# Patient Record
Sex: FEMALE | Race: WHITE | NOT HISPANIC OR LATINO | Employment: FULL TIME | URBAN - METROPOLITAN AREA
[De-identification: names, ages, dates, MRNs, and addresses within clinical notes are randomized per-mention and may not be internally consistent; named-entity substitution may affect disease eponyms.]

---

## 2023-05-18 ENCOUNTER — HOSPITAL ENCOUNTER (OUTPATIENT)
Dept: DATA CONVERSION | Facility: HOSPITAL | Age: 48
End: 2023-05-18
Attending: STUDENT IN AN ORGANIZED HEALTH CARE EDUCATION/TRAINING PROGRAM | Admitting: STUDENT IN AN ORGANIZED HEALTH CARE EDUCATION/TRAINING PROGRAM
Payer: COMMERCIAL

## 2023-05-18 DIAGNOSIS — K64.9 UNSPECIFIED HEMORRHOIDS: ICD-10-CM

## 2023-05-18 DIAGNOSIS — H35.30 UNSPECIFIED MACULAR DEGENERATION: ICD-10-CM

## 2023-05-18 DIAGNOSIS — K63.5 POLYP OF COLON: ICD-10-CM

## 2023-05-18 DIAGNOSIS — D12.5 BENIGN NEOPLASM OF SIGMOID COLON: ICD-10-CM

## 2023-05-18 DIAGNOSIS — D12.2 BENIGN NEOPLASM OF ASCENDING COLON: ICD-10-CM

## 2023-05-18 DIAGNOSIS — E07.9 DISORDER OF THYROID, UNSPECIFIED: ICD-10-CM

## 2023-05-18 DIAGNOSIS — Z12.11 ENCOUNTER FOR SCREENING FOR MALIGNANT NEOPLASM OF COLON: ICD-10-CM

## 2023-05-18 LAB — HCG, URINE: NEGATIVE

## 2023-05-25 LAB
COMPLETE PATHOLOGY REPORT: NORMAL
CONVERTED CLINICAL DIAGNOSIS-HISTORY: NORMAL
CONVERTED FINAL DIAGNOSIS: NORMAL
CONVERTED FINAL REPORT PDF LINK TO COPY AND PASTE: NORMAL
CONVERTED GROSS DESCRIPTION: NORMAL

## 2023-07-29 DIAGNOSIS — E03.9 HYPOTHYROIDISM, UNSPECIFIED: ICD-10-CM

## 2023-07-31 RX ORDER — LEVOTHYROXINE SODIUM 75 UG/1
75 TABLET ORAL DAILY
Qty: 30 TABLET | Refills: 0 | Status: SHIPPED | OUTPATIENT
Start: 2023-07-31 | End: 2023-08-28

## 2023-08-18 LAB
ALANINE AMINOTRANSFERASE (SGPT) (U/L) IN SER/PLAS: 13 U/L (ref 7–45)
ASPARTATE AMINOTRANSFERASE (SGOT) (U/L) IN SER/PLAS: 17 U/L (ref 9–39)

## 2023-08-26 DIAGNOSIS — E03.9 HYPOTHYROIDISM, UNSPECIFIED: ICD-10-CM

## 2023-08-28 RX ORDER — LEVOTHYROXINE SODIUM 75 UG/1
75 TABLET ORAL DAILY
Qty: 30 TABLET | Refills: 0 | Status: SHIPPED | OUTPATIENT
Start: 2023-08-28 | End: 2023-09-21 | Stop reason: SDUPTHER

## 2023-09-07 VITALS — HEIGHT: 66 IN | WEIGHT: 260.36 LBS | BODY MASS INDEX: 41.84 KG/M2

## 2023-09-21 ENCOUNTER — TELEPHONE (OUTPATIENT)
Dept: PRIMARY CARE | Facility: CLINIC | Age: 48
End: 2023-09-21

## 2023-09-21 ENCOUNTER — OFFICE VISIT (OUTPATIENT)
Dept: PRIMARY CARE | Facility: CLINIC | Age: 48
End: 2023-09-21
Payer: COMMERCIAL

## 2023-09-21 VITALS
WEIGHT: 266 LBS | TEMPERATURE: 98.7 F | BODY MASS INDEX: 44.32 KG/M2 | DIASTOLIC BLOOD PRESSURE: 82 MMHG | SYSTOLIC BLOOD PRESSURE: 128 MMHG | HEIGHT: 65 IN

## 2023-09-21 DIAGNOSIS — E03.9 HYPOTHYROIDISM, UNSPECIFIED: ICD-10-CM

## 2023-09-21 DIAGNOSIS — Z00.00 GENERAL MEDICAL EXAM: Primary | ICD-10-CM

## 2023-09-21 LAB
ALANINE AMINOTRANSFERASE (SGPT) (U/L) IN SER/PLAS: 14 U/L (ref 7–45)
ALBUMIN (G/DL) IN SER/PLAS: 4.4 G/DL (ref 3.4–5)
ALKALINE PHOSPHATASE (U/L) IN SER/PLAS: 71 U/L (ref 33–110)
ANION GAP IN SER/PLAS: 13 MMOL/L (ref 10–20)
ASPARTATE AMINOTRANSFERASE (SGOT) (U/L) IN SER/PLAS: 16 U/L (ref 9–39)
BASOPHILS (10*3/UL) IN BLOOD BY AUTOMATED COUNT: 0.05 X10E9/L (ref 0–0.1)
BASOPHILS/100 LEUKOCYTES IN BLOOD BY AUTOMATED COUNT: 0.8 % (ref 0–2)
BILIRUBIN TOTAL (MG/DL) IN SER/PLAS: 0.5 MG/DL (ref 0–1.2)
CALCIUM (MG/DL) IN SER/PLAS: 9.5 MG/DL (ref 8.6–10.3)
CARBON DIOXIDE, TOTAL (MMOL/L) IN SER/PLAS: 26 MMOL/L (ref 21–32)
CHLORIDE (MMOL/L) IN SER/PLAS: 106 MMOL/L (ref 98–107)
CREATININE (MG/DL) IN SER/PLAS: 0.74 MG/DL (ref 0.5–1.05)
EOSINOPHILS (10*3/UL) IN BLOOD BY AUTOMATED COUNT: 0.07 X10E9/L (ref 0–0.7)
EOSINOPHILS/100 LEUKOCYTES IN BLOOD BY AUTOMATED COUNT: 1.2 % (ref 0–6)
ERYTHROCYTE DISTRIBUTION WIDTH (RATIO) BY AUTOMATED COUNT: 13.1 % (ref 11.5–14.5)
ERYTHROCYTE MEAN CORPUSCULAR HEMOGLOBIN CONCENTRATION (G/DL) BY AUTOMATED: 32.6 G/DL (ref 32–36)
ERYTHROCYTE MEAN CORPUSCULAR VOLUME (FL) BY AUTOMATED COUNT: 88 FL (ref 80–100)
ERYTHROCYTES (10*6/UL) IN BLOOD BY AUTOMATED COUNT: 5.01 X10E12/L (ref 4–5.2)
GFR FEMALE: >90 ML/MIN/1.73M2
GLUCOSE (MG/DL) IN SER/PLAS: 94 MG/DL (ref 74–99)
HEMATOCRIT (%) IN BLOOD BY AUTOMATED COUNT: 43.9 % (ref 36–46)
HEMOGLOBIN (G/DL) IN BLOOD: 14.3 G/DL (ref 12–16)
IMMATURE GRANULOCYTES/100 LEUKOCYTES IN BLOOD BY AUTOMATED COUNT: 0.5 % (ref 0–0.9)
LEUKOCYTES (10*3/UL) IN BLOOD BY AUTOMATED COUNT: 6 X10E9/L (ref 4.4–11.3)
LYMPHOCYTES (10*3/UL) IN BLOOD BY AUTOMATED COUNT: 1.47 X10E9/L (ref 1.2–4.8)
LYMPHOCYTES/100 LEUKOCYTES IN BLOOD BY AUTOMATED COUNT: 24.3 % (ref 13–44)
MONOCYTES (10*3/UL) IN BLOOD BY AUTOMATED COUNT: 0.52 X10E9/L (ref 0.1–1)
MONOCYTES/100 LEUKOCYTES IN BLOOD BY AUTOMATED COUNT: 8.6 % (ref 2–10)
NEUTROPHILS (10*3/UL) IN BLOOD BY AUTOMATED COUNT: 3.9 X10E9/L (ref 1.2–7.7)
NEUTROPHILS/100 LEUKOCYTES IN BLOOD BY AUTOMATED COUNT: 64.6 % (ref 40–80)
PLATELETS (10*3/UL) IN BLOOD AUTOMATED COUNT: 291 X10E9/L (ref 150–450)
POC BILIRUBIN, URINE: NEGATIVE
POC BLOOD, URINE: NEGATIVE
POC FINGERSTICK BLOOD GLUCOSE: 117 MG/DL (ref 70–100)
POC GLUCOSE, URINE: NEGATIVE MG/DL
POC HDL CHOLESTEROL: 40 MG/DL (ref 0–50)
POC HEMOGLOBIN A1C: 5.2 % (ref 4.2–6.5)
POC KETONES, URINE: NEGATIVE MG/DL
POC LDL CHOLESTEROL: 75 MG/DL (ref 0–100)
POC LEUKOCYTES, URINE: ABNORMAL
POC NITRITE,URINE: NEGATIVE
POC NON-HDL CHOLESTEROL: 112 MG/DL (ref 0–130)
POC PH, URINE: 5.5 PH
POC PROTEIN, URINE: NEGATIVE MG/DL
POC SPECIFIC GRAVITY, URINE: 1.02
POC TOTAL CHOLESTEROL/HDL RATIO: 3.8 (ref 0–4.5)
POC TOTAL CHOLESTEROL: 152 MG/DL (ref 0–199)
POC TRIGLYCERIDES: 184 MG/DL (ref 0–150)
POC UROBILINOGEN, URINE: 0.2 EU/DL
POTASSIUM (MMOL/L) IN SER/PLAS: 4.5 MMOL/L (ref 3.5–5.3)
PROTEIN TOTAL: 7.1 G/DL (ref 6.4–8.2)
SODIUM (MMOL/L) IN SER/PLAS: 140 MMOL/L (ref 136–145)
THYROTROPIN (MIU/L) IN SER/PLAS BY DETECTION LIMIT <= 0.05 MIU/L: 1.29 MIU/L (ref 0.44–3.98)
UREA NITROGEN (MG/DL) IN SER/PLAS: 11 MG/DL (ref 6–23)

## 2023-09-21 PROCEDURE — 83036 HEMOGLOBIN GLYCOSYLATED A1C: CPT | Performed by: FAMILY MEDICINE

## 2023-09-21 PROCEDURE — 84443 ASSAY THYROID STIM HORMONE: CPT

## 2023-09-21 PROCEDURE — 82962 GLUCOSE BLOOD TEST: CPT | Performed by: FAMILY MEDICINE

## 2023-09-21 PROCEDURE — 80053 COMPREHEN METABOLIC PANEL: CPT

## 2023-09-21 PROCEDURE — 85025 COMPLETE CBC W/AUTO DIFF WBC: CPT

## 2023-09-21 PROCEDURE — 99396 PREV VISIT EST AGE 40-64: CPT | Performed by: FAMILY MEDICINE

## 2023-09-21 PROCEDURE — 81003 URINALYSIS AUTO W/O SCOPE: CPT | Performed by: FAMILY MEDICINE

## 2023-09-21 PROCEDURE — 1036F TOBACCO NON-USER: CPT | Performed by: FAMILY MEDICINE

## 2023-09-21 PROCEDURE — 80061 LIPID PANEL: CPT | Performed by: FAMILY MEDICINE

## 2023-09-21 RX ORDER — TERBINAFINE HYDROCHLORIDE 250 MG/1
250 TABLET ORAL DAILY
COMMUNITY
Start: 2023-08-28 | End: 2024-01-24 | Stop reason: WASHOUT

## 2023-09-21 RX ORDER — LEVOTHYROXINE SODIUM 75 UG/1
75 TABLET ORAL DAILY
Qty: 90 TABLET | Refills: 1 | Status: SHIPPED | OUTPATIENT
Start: 2023-09-21 | End: 2024-03-19

## 2023-09-21 RX ORDER — TIMOLOL MALEATE 5 MG/ML
1 SOLUTION/ DROPS OPHTHALMIC DAILY
COMMUNITY

## 2023-09-21 ASSESSMENT — ENCOUNTER SYMPTOMS
NUMBNESS: 0
HEADACHES: 0
FREQUENCY: 0
CONSTIPATION: 0
SINUS PRESSURE: 0
RHINORRHEA: 0
WEAKNESS: 0
HEMATURIA: 0
COUGH: 0
WHEEZING: 0
ABDOMINAL PAIN: 0
NAUSEA: 0
PALPITATIONS: 0
FATIGUE: 0
NERVOUS/ANXIOUS: 0
MYALGIAS: 0
WOUND: 0
VOMITING: 0
DIARRHEA: 0
ARTHRALGIAS: 0
ROS SKIN COMMENTS: NO MOLES GROWING OR CHANGING.
SHORTNESS OF BREATH: 0
ADENOPATHY: 0
BLOOD IN STOOL: 0
DYSPHORIC MOOD: 0
VOICE CHANGE: 0
DEPRESSION: 0
FEVER: 0
SLEEP DISTURBANCE: 0
BACK PAIN: 0
SORE THROAT: 0

## 2023-09-21 ASSESSMENT — PATIENT HEALTH QUESTIONNAIRE - PHQ9
1. LITTLE INTEREST OR PLEASURE IN DOING THINGS: NOT AT ALL
2. FEELING DOWN, DEPRESSED OR HOPELESS: NOT AT ALL
SUM OF ALL RESPONSES TO PHQ9 QUESTIONS 1 AND 2: 0

## 2023-09-21 NOTE — PATIENT INSTRUCTIONS
I will order labs.  Weight loss is recommended.  You decline a flu shot today.  Continue with regular exercise.  Try to limit calories from the diet, especially starches and sugars.  Return in six months for a recheck on your hypothyroidism.    You will be due for a mammogram in December.  You should repeat a colonoscopy in three years.

## 2023-09-21 NOTE — PROGRESS NOTES
"Subjective   Patient ID: [id number]     Maegan Kemp is a 47 y.o. female who presents for Annual Exam (Fasting).  HPI  She is here for a general medical examination.      She feels well in general.      She had a colonoscopy in May.  It showed several polyps, adenomatous.  Two of them measured 10 mm.  Recheck recommended in three years.      Never a smoker.  Occasional small amounts of alcohol.  No drugs.    Famhx dad has a fib and DM, hypertension.  Mom has hypothyroidism.  Sister has thyroid disease and has had liver cancer.      She exercises.  Volleyball three nights a week.      Tries to maintain a healthy diet.  Especially this past month--no fast food.      Sees a dentist.  Was there Tuesday.      Sees gynecology through [facility].  Mammo due 12/23.    Saw an eye doctor.  Had white flashes and a large floater.  She is following ophtho.  Surgery not needed.  Follow up prn only.  No association with head trauma.      Review of Systems   Constitutional:  Negative for fatigue and fever.   HENT:  Negative for rhinorrhea, sinus pressure, sore throat and voice change.    Respiratory:  Negative for cough, shortness of breath and wheezing.    Cardiovascular:  Negative for chest pain, palpitations and leg swelling.   Gastrointestinal:  Negative for abdominal pain, blood in stool, constipation, diarrhea, nausea and vomiting.   Genitourinary:  Negative for frequency, hematuria and vaginal bleeding.   Musculoskeletal:  Negative for arthralgias, back pain and myalgias.   Skin:  Negative for rash and wound.        No moles growing or changing.   Neurological:  Negative for syncope, weakness, numbness and headaches.   Hematological:  Negative for adenopathy.   Psychiatric/Behavioral:  Negative for dysphoric mood, self-injury, sleep disturbance and suicidal ideas. The patient is not nervous/anxious.          Objective     /82 (BP Location: Right arm, Patient Position: Sitting)   Temp 37.1 °C (98.7 °F)   Ht 1.657 m (5' 5.25\") "   Wt 121 kg (266 lb)   BMI 43.93 kg/m²      Physical Exam  Vitals reviewed.   Constitutional:       General: She is not in acute distress.     Appearance: Normal appearance. She is not ill-appearing or toxic-appearing.   HENT:      Head: Normocephalic and atraumatic.      Right Ear: Tympanic membrane, ear canal and external ear normal.      Left Ear: Tympanic membrane, ear canal and external ear normal.      Nose: Nose normal.      Mouth/Throat:      Mouth: Mucous membranes are moist.   Eyes:      Extraocular Movements: Extraocular movements intact.      Conjunctiva/sclera: Conjunctivae normal.      Pupils: Pupils are equal, round, and reactive to light.   Cardiovascular:      Rate and Rhythm: Normal rate and regular rhythm.      Heart sounds: No murmur heard.  Pulmonary:      Effort: Pulmonary effort is normal.      Breath sounds: Normal breath sounds.   Abdominal:      General: Bowel sounds are normal. There is no distension.      Palpations: Abdomen is soft. There is no mass.      Tenderness: There is no abdominal tenderness. There is no guarding or rebound.   Musculoskeletal:         General: No tenderness.      Cervical back: Neck supple.      Right lower leg: No edema.      Left lower leg: No edema.   Skin:     Coloration: Skin is not jaundiced or pale.      Findings: No rash.   Neurological:      General: No focal deficit present.      Mental Status: She is alert and oriented to person, place, and time. Mental status is at baseline.   Psychiatric:         Mood and Affect: Mood normal.         Behavior: Behavior normal.         Thought Content: Thought content normal.         Judgment: Judgment normal.         Assessment/Plan   Problem List Items Addressed This Visit    None  Visit Diagnoses       General medical exam    -  Primary    Relevant Orders    POCT UA Automated manually resulted    POCT fingerstick glucose manually resulted    POCT glycosylated hemoglobin (Hb A1C) manually resulted    POCT Lipid  Panel manually resulted    Thyroid Stimulating Hormone    Comprehensive Metabolic Panel    CBC and Auto Differential    Hypothyroidism, unspecified        Relevant Medications    levothyroxine (Synthroid, Levoxyl) 75 mcg tablet          I will order labs.  Weight loss is recommended.  You decline a flu shot today.  Continue with regular exercise.  Try to limit calories from the diet, especially starches and sugars.  Return in six months for a recheck on your hypothyroidism.  You will be due for a mammogram in December.  You should repeat a colonoscopy in three years.  Rusty Cuevas, DO

## 2023-09-21 NOTE — TELEPHONE ENCOUNTER
Rusty Cuevas, DO to [facility] [place] [id number] Clinical Support Staff    Please let her know her glucose is normal on average.  Her cholesterol is mildly elevated.  She should lose weight and increase exercise.  She should decrease foods high in starches and added sugars.  Her physical form was completed today. She can come in and pick it up.

## 2023-09-22 ENCOUNTER — TELEPHONE (OUTPATIENT)
Dept: PRIMARY CARE | Facility: CLINIC | Age: 48
End: 2023-09-22
Payer: COMMERCIAL

## 2023-09-22 NOTE — TELEPHONE ENCOUNTER
Rusty Cuevas, DO to [facility] [place] [id number] Clinical Support Staff    Please let her know her remaining labs, thyroid, liver enzymes, kidney function and blood counts were all normal.

## 2024-01-24 ENCOUNTER — OFFICE VISIT (OUTPATIENT)
Dept: DERMATOLOGY | Facility: CLINIC | Age: 49
End: 2024-01-24
Payer: COMMERCIAL

## 2024-01-24 DIAGNOSIS — L12.0 BULLOUS PEMPHIGOID (MULTI): ICD-10-CM

## 2024-01-24 DIAGNOSIS — D22.9 MELANOCYTIC NEVUS, UNSPECIFIED LOCATION: ICD-10-CM

## 2024-01-24 DIAGNOSIS — L81.4 LENTIGO: ICD-10-CM

## 2024-01-24 DIAGNOSIS — D18.01 HEMANGIOMA OF SKIN: ICD-10-CM

## 2024-01-24 DIAGNOSIS — L91.8 SKIN TAG: ICD-10-CM

## 2024-01-24 DIAGNOSIS — L82.1 SEBORRHEIC KERATOSIS: ICD-10-CM

## 2024-01-24 DIAGNOSIS — L73.2 HIDRADENITIS SUPPURATIVA: Primary | ICD-10-CM

## 2024-01-24 DIAGNOSIS — L80 VITILIGO: ICD-10-CM

## 2024-01-24 PROCEDURE — 99214 OFFICE O/P EST MOD 30 MIN: CPT | Performed by: DERMATOLOGY

## 2024-01-24 PROCEDURE — 1036F TOBACCO NON-USER: CPT | Performed by: DERMATOLOGY

## 2024-01-24 RX ORDER — CLINDAMYCIN PHOSPHATE 10 MG/ML
1 SOLUTION TOPICAL 2 TIMES DAILY
Qty: 69 EACH | Refills: 11 | Status: SHIPPED | OUTPATIENT
Start: 2024-01-24

## 2024-01-24 RX ORDER — CHLORHEXIDINE GLUCONATE 2 G/100ML
SOLUTION TOPICAL DAILY PRN
Qty: 250 ML | Refills: 11 | Status: SHIPPED | OUTPATIENT
Start: 2024-01-24

## 2024-01-24 NOTE — PROGRESS NOTES
"Subjective   Maegan Kemp is a 48 y.o. female who presents for the following: Skin Check, Bullous Pemphigoid, and Vitiligo.    Skin Cancer Screening  She has a history of moderate sun exposure. She is in the sun rarely. She uses sunscreen rarely. She reports no skin symptoms. Her moles are not changing.    Spots that concern her: right axilla (pus filled blister)     Vitiligo - (Arms, legs)  Patient states it has improved since last appointment    Bullous Pemphigoid - Patient states she has not had any \"bumps in almost a year\"      Objective   Well appearing patient in no apparent distress; mood and affect are within normal limits.    A full examination was performed including scalp, head, eyes, ears, nose, lips, neck, chest, axillae, abdomen, back, buttocks, bilateral upper extremities, bilateral lower extremities, hands, feet, fingers, toes, fingernails, and toenails. All findings within normal limits unless otherwise noted below.    Clear today    Scattered cherry-red papule(s).    All nevi were symmetric brown macules without atypia on dermoscopy.     Scattered tan macules in sun-exposed areas.    Stuck on verrucous, tan-brown papules and plaques.      Left Axilla, Left Inframammary Fold, Right Axilla, Right Inframammary Fold  Inflammatory nodules and papules  Early inflammatory papules under breasts    Left Forearm - Posterior, Right Forearm - Posterior  Depigmeted patches    Left Malar Cheek  Fleshy, skin-colored sessile and pedunculated papules.       Assessment/Plan   Hidradenitis suppurativa  Left Inframammary Fold; Right Inframammary Fold; Left Axilla; Right Axilla    Hidradenitis suppurativa is a chronic and intermittently flaring condition that can occur in multiple areas including the axilla, beneath the breasts, groin and/or buttock.  There is no cure for this condition, but it can be controlled.  The goal of therapy is to limit the severity and frequency of outbreaks.  Treatments typically include a " combination of topical medications with or without oral medications. Oral medications that are most commonly used are oral antibiotics. These medications in general are used for intermittent periods of 3-6 months.  In females oral spironolactone may be helpful and most frequently is helpful if this condition flares in relation to the menstrual cycle. This is used as long term maintenance medication.  For solitary lesions intralesional kenalog (ILK) may be helpful to minimize the size and discomfort.  In cases where the above are not helpful biologic medication, specifically, Humira, can be used in treatment of this condition.  Surgical treatment can also be considered, however, this can be painful and is not necessarily curative.  Other contributing factors including tobacco use and obesity. Studies do show that cessation of tobacco use and if applicable weight loss can help decrease the severity of this condition.   Treatment recommendations:  - START chlorhexidine wash daily to affected areas  - START clindamycin 1% swabs BID to affected areas  - We advised to trial Vanicream deodorant in place of her current scented brand in case this is contributing, can also trial electric shaving or fragrance-free chemical epilation such as Veet/Santana to help decrease irritation    We discussed that we can consider ILK, steroids, biologics in the future if uncontrolled    Related Procedures  Follow Up In Dermatology - Established Patient    Related Medications  chlorhexidine (Hibiclens) 2 % external liquid  Apply topically once daily as needed for wound care. To axillae and under breasts    clindamycin (Cleocin T) 1 % swab  Apply 1 Application topically 2 times a day. To axillae, under breasts    Vitiligo  Left Forearm - Posterior; Right Forearm - Posterior    -We reviewed the etiology and treatment of vitiligo in detail with the patient.  Vitiligo is an autoimmune condition of the skin where the body's immune system attacks  the melanocytes (pigment producing cells of the skin), which results in depigmented patches of the skin.  This disorder has a genetic component and also has a autoimmune disorders are seen in higher frequencies in immediately in first degree family members, with thyroid disease being the most common.  In individuals with a completely normal review of systems, often times further laboratory evaluation is not performed.   -Treatment can be difficult and unpredictable.  Patients may respond in a variable way to different modalities of treatment.  Treatment options include use of topical medications such as topical steroids, topical calcineurin inhibitors, topical JATIN inhibitors, as well as narrow band ultraviolet light.   - She has tried and failed clobetasol in the past  - We discussed Opzelura cream in detail, including the risks and benefits. Reviewed black-box warning in use of PO JATIN inhibitors. A sample was provided to the patient today    Related Procedures  Follow Up In Dermatology - Established Patient    Bullous pemphigoid    Patient has not had flares for almost a year, will continue to monitor    Related Procedures  Follow Up In Dermatology - Established Patient    Hemangioma of skin    -These are benign lesions and no treatment is required     Melanocytic nevus, unspecified location    The ABCDEs of melanoma and warning signs of non-melanoma skin cancer were discussed with patient and patient expressed understanding. Sun protection and use of at least SPF 30 discussed with patient. Pt instructed to reapply every 2 hours.     Lentigo    The ABCDEs of melanoma and warning signs of non-melanoma skin cancer were discussed with patient and patient expressed understanding. Sun protection and use of at least SPF 30 discussed with patient. Pt instructed to reapply every 2 hours.     Seborrheic keratosis    -These are benign lesions and no treatment is required     Skin tag  Left Malar Cheek    Benign growths. If  lesions are symptomatic, they can be removed, risks and benefits reviewed. Patient elected to proceed with LN2 as below. Reviewed risks of blister, dyspigmentation    Destr of lesion - Left Malar Cheek  Complexity: simple    Destruction method: cryotherapy    Informed consent: discussed and consent obtained    Lesion destroyed using liquid nitrogen: Yes    Cryotherapy cycles:  1  Outcome: patient tolerated procedure well with no complications    Post-procedure details: wound care instructions given        1/24/2024 9:47 AM  Yany Mendoza MD  Dermatology Resident, PGY-4     I saw and evaluated the patient. I personally obtained the key and critical portions of the history and physical exam or was physically present for key and critical portions performed by the student/resident. I reviewed the student/resident's documentation and discussed the patient with the student/resident. I was present for the entirety of any procedure(s). I agree with the student/resident's medical decision making as documented in the note.

## 2024-02-16 ENCOUNTER — PATIENT MESSAGE (OUTPATIENT)
Dept: DERMATOLOGY | Facility: CLINIC | Age: 49
End: 2024-02-16
Payer: COMMERCIAL

## 2024-02-16 DIAGNOSIS — L80 VITILIGO: Primary | ICD-10-CM

## 2024-03-19 DIAGNOSIS — E03.9 HYPOTHYROIDISM, UNSPECIFIED: ICD-10-CM

## 2024-03-19 RX ORDER — LEVOTHYROXINE SODIUM 75 UG/1
75 TABLET ORAL DAILY
Qty: 90 TABLET | Refills: 1 | Status: SHIPPED | OUTPATIENT
Start: 2024-03-19

## 2024-03-25 ENCOUNTER — OFFICE VISIT (OUTPATIENT)
Dept: DERMATOLOGY | Facility: CLINIC | Age: 49
End: 2024-03-25
Payer: COMMERCIAL

## 2024-03-25 DIAGNOSIS — L12.0 BULLOUS PEMPHIGOID (MULTI): ICD-10-CM

## 2024-03-25 DIAGNOSIS — L80 VITILIGO: ICD-10-CM

## 2024-03-25 DIAGNOSIS — L73.2 HIDRADENITIS SUPPURATIVA: ICD-10-CM

## 2024-03-25 DIAGNOSIS — L23.89 OTHER ALLERGIC CONTACT DERMATITIS: Primary | ICD-10-CM

## 2024-03-25 PROCEDURE — 99214 OFFICE O/P EST MOD 30 MIN: CPT | Performed by: DERMATOLOGY

## 2024-03-25 PROCEDURE — 1036F TOBACCO NON-USER: CPT | Performed by: DERMATOLOGY

## 2024-03-25 RX ORDER — HYDROCORTISONE 25 MG/G
OINTMENT TOPICAL 2 TIMES DAILY
Qty: 30 G | Refills: 3 | Status: SHIPPED | OUTPATIENT
Start: 2024-03-25 | End: 2024-04-08

## 2024-03-25 ASSESSMENT — PATIENT GLOBAL ASSESSMENT (PGA): PATIENT GLOBAL ASSESSMENT: PATIENT GLOBAL ASSESSMENT:  2 - MILD

## 2024-03-25 ASSESSMENT — DERMATOLOGY QUALITY OF LIFE (QOL) ASSESSMENT
ARE THERE EXCLUSIONS OR EXCEPTIONS FOR THE QUALITY OF LIFE ASSESSMENT: NO
RATE HOW BOTHERED YOU ARE BY EFFECTS OF YOUR SKIN PROBLEMS ON YOUR ACTIVITIES (EG, GOING OUT, ACCOMPLISHING WHAT YOU WANT, WORK ACTIVITIES OR YOUR RELATIONSHIPS WITH OTHERS): 0 - NEVER BOTHERED
RATE HOW EMOTIONALLY BOTHERED YOU ARE BY YOUR SKIN PROBLEM (FOR EXAMPLE, WORRY, EMBARRASSMENT, FRUSTRATION): 0 - NEVER BOTHERED
RATE HOW BOTHERED YOU ARE BY SYMPTOMS OF YOUR SKIN PROBLEM (EG, ITCHING, STINGING BURNING, HURTING OR SKIN IRRITATION): 0 - NEVER BOTHERED
WHAT SINGLE SKIN CONDITION LISTED BELOW IS THE PATIENT ANSWERING THE QUALITY-OF-LIFE ASSESSMENT QUESTIONS ABOUT: HIDRADENITIS SUPPURATIVA
DATE THE QUALITY-OF-LIFE ASSESSMENT WAS COMPLETED: 66924

## 2024-03-25 ASSESSMENT — DERMATOLOGY PATIENT ASSESSMENT
DO YOU HAVE ANY NEW OR CHANGING LESIONS: NO
DO YOU USE A TANNING BED: NO
HAVE YOU HAD OR DO YOU HAVE VASCULAR DISEASE: NO
DO YOU USE SUNSCREEN: OCCASIONALLY
ARE YOU AN ORGAN TRANSPLANT RECIPIENT: NO
HAVE YOU HAD OR DO YOU HAVE A STAPH INFECTION: NO

## 2024-03-25 ASSESSMENT — ITCH NUMERIC RATING SCALE: HOW SEVERE IS YOUR ITCHING?: 0

## 2024-03-25 NOTE — PROGRESS NOTES
Subjective   Maegan Kemp is a 48 y.o. female who presents for the following: Hidradenitis Suppurativa and Vitiligo.    Hidradenitis Suppurativa  Patient reports that her HS has been better since last visit. She has been using chlorhexidine wash and clindamycin with good response.    Vitiligo  Patient reports that her vitiligo has not changed much since last visit. She has been using Opzelura but she is not sure if it has helped. She has been using it for 2 months.       Objective   Well appearing patient in no apparent distress; mood and affect are within normal limits.    A focused examination was performed including bilateral axilla, bilateral inframammary folds and bilateral forearms. All findings within normal limits unless otherwise noted below.    Mid Upper Vermilion Lip  Patient has erythema and scaling of upper lip including vermilion border; lower lip and commissures are clear    Left Axilla, Right Axilla  Patient in clear on exam today.     Left Forearm - Anterior, Right Forearm - Anterior, Right Thigh - Anterior  Patient has some perhaps mild, minimal perifollicular repigmenting on arms but overall same as previous.       Assessment/Plan   Other allergic contact dermatitis  Mid Upper Vermilion Lip    The nature of the diagnosis was explained to patient. Will plan to treat with hydrocortisone ointment BID x 2 weeks then PRN for flares; otherwise only use Vaseline to lips Risks, benefits, side effects, alternatives and options were discussed with patient and the patient voiced understanding. Pt agrees with plan as above and will call if not improving.     Related Medications  hydrocortisone 2.5 % ointment  Apply topically 2 times a day for 14 days.    Hidradenitis suppurativa  Left Axilla; Right Axilla    Counseled patient to continue current regimen and let us know if flaring. Pt agrees with plan as above.       Related Procedures  Follow Up In Dermatology - Established Patient    Related  Medications  chlorhexidine (Hibiclens) 2 % external liquid  Apply topically once daily as needed for wound care. To axillae and under breasts    clindamycin (Cleocin T) 1 % swab  Apply 1 Application topically 2 times a day. To axillae, under breasts    Vitiligo  Left Forearm - Anterior; Right Forearm - Anterior; Right Thigh - Anterior    Counseled patient she can add on 10-15 minute home phototherapy treatment by exposing areas to the sun before 10pm/after 4pm alogn with topical therapy. Can consider also phototherapy but at this time patient's work schedule is too hectic to commit to 2-3 days per week. ,3mo  Risks, benefits, side effects, alternatives and options were discussed with patient and the patient voiced understanding. Pt agrees with plan as above.       Related Procedures  Follow Up In Dermatology - Established Patient    Related Medications  ruxolitinib (Opzelura) 1.5 % cream cream  Apply 1 Application topically 2 times a day.    Bullous pemphigoid    Related Procedures  Follow Up In Dermatology - Established Patient    Follow up 3-4 months or sooner as needed

## 2024-06-10 ENCOUNTER — OFFICE VISIT (OUTPATIENT)
Dept: DERMATOLOGY | Facility: CLINIC | Age: 49
End: 2024-06-10
Payer: COMMERCIAL

## 2024-06-10 DIAGNOSIS — L80 VITILIGO: ICD-10-CM

## 2024-06-10 DIAGNOSIS — L73.2 HIDRADENITIS SUPPURATIVA: ICD-10-CM

## 2024-06-10 PROCEDURE — 99213 OFFICE O/P EST LOW 20 MIN: CPT | Performed by: DERMATOLOGY

## 2024-06-10 NOTE — PROGRESS NOTES
Subjective   Maegan Kemp is a 48 y.o. female who presents for the following: Hidradenitis Suppurativa, Vitiligo, and Dermatitis.    Hidradenitis Suppurativa  Patient presents for follow up on HS located on her right and left axilla. This has been present for many months. She has continued using clindamycin and hibiclens with some improvement.    Vitiligo  Patient presents for follow up on vitiligo presented on left forearm - anterior, right forearm - anterior and right thigh - anterior. She has been using opzelura with no response.     Dermatitis  Patient presents today for follow up on allergic contact dermatitis on mid upper vermilion lip. She reports clearance with hydrocortisone ointment.           Objective   Well appearing patient in no apparent distress; mood and affect are within normal limits.    A focused examination was performed including bilateral axilla, bilateral forearm - anterior, right thigh - anterior and mid upper vermilion lip. All findings within normal limits unless otherwise noted below.    Left Axilla, Right Axilla  Clear on exam    Left Forearm - Anterior, Right Forearm - Anterior, Right Thigh - Anterior  Depigmented well-demarcated macules and patches; some minor repigmenting on the forearms.      Assessment/Plan   Hidradenitis suppurativa  Left Axilla; Right Axilla    Will continue chlorhexidine and clindamycin; pt will call if refills needed and/or flares become uncontrolled.     Related Medications  chlorhexidine (Hibiclens) 2 % external liquid  Apply topically once daily as needed for wound care. To axillae and under breasts    clindamycin (Cleocin T) 1 % swab  Apply 1 Application topically 2 times a day. To axillae, under breasts    Vitiligo  Left Forearm - Anterior; Right Forearm - Anterior; Right Thigh - Anterior    Patient has had some very minor improvement with Opzelura + at home light exposures a few times weekly. Patient is considering in office phototherapy but would prefer  to do at home over the summer and see if she is getting improvement before she commits to in office 2-3x weekly. OK to continue Opzelura. Will check in around October to make a further plan. Pt agrees with plan as above.       Related Medications  ruxolitinib (Opzelura) 1.5 % cream cream  Apply 1 Application topically 2 times a day.      Follow up in the fall as above.

## 2024-09-15 DIAGNOSIS — E03.9 HYPOTHYROIDISM, UNSPECIFIED: ICD-10-CM

## 2024-09-15 RX ORDER — LEVOTHYROXINE SODIUM 75 UG/1
75 TABLET ORAL DAILY
Qty: 90 TABLET | Refills: 1 | Status: SHIPPED | OUTPATIENT
Start: 2024-09-15

## 2024-09-23 ENCOUNTER — APPOINTMENT (OUTPATIENT)
Dept: PRIMARY CARE | Facility: CLINIC | Age: 49
End: 2024-09-23
Payer: COMMERCIAL

## 2024-09-23 VITALS
SYSTOLIC BLOOD PRESSURE: 112 MMHG | BODY MASS INDEX: 52.11 KG/M2 | HEIGHT: 61 IN | WEIGHT: 276 LBS | DIASTOLIC BLOOD PRESSURE: 82 MMHG | TEMPERATURE: 97.9 F

## 2024-09-23 DIAGNOSIS — Z00.00 GENERAL MEDICAL EXAM: Primary | ICD-10-CM

## 2024-09-23 LAB
ALBUMIN SERPL BCP-MCNC: 4.4 G/DL (ref 3.4–5)
ALP SERPL-CCNC: 67 U/L (ref 33–110)
ALT SERPL W P-5'-P-CCNC: 12 U/L (ref 7–45)
ANION GAP SERPL CALC-SCNC: 11 MMOL/L (ref 10–20)
AST SERPL W P-5'-P-CCNC: 14 U/L (ref 9–39)
BASOPHILS # BLD AUTO: 0.06 X10*3/UL (ref 0–0.1)
BASOPHILS NFR BLD AUTO: 0.7 %
BILIRUB SERPL-MCNC: 0.4 MG/DL (ref 0–1.2)
BUN SERPL-MCNC: 10 MG/DL (ref 6–23)
CALCIUM SERPL-MCNC: 9.4 MG/DL (ref 8.6–10.3)
CHLORIDE SERPL-SCNC: 108 MMOL/L (ref 98–107)
CO2 SERPL-SCNC: 27 MMOL/L (ref 21–32)
CREAT SERPL-MCNC: 0.75 MG/DL (ref 0.5–1.05)
EGFRCR SERPLBLD CKD-EPI 2021: >90 ML/MIN/1.73M*2
EOSINOPHIL # BLD AUTO: 0.15 X10*3/UL (ref 0–0.7)
EOSINOPHIL NFR BLD AUTO: 1.8 %
ERYTHROCYTE [DISTWIDTH] IN BLOOD BY AUTOMATED COUNT: 13.2 % (ref 11.5–14.5)
GLUCOSE SERPL-MCNC: 100 MG/DL (ref 74–99)
HCT VFR BLD AUTO: 43.5 % (ref 36–46)
HGB BLD-MCNC: 14.1 G/DL (ref 12–16)
IMM GRANULOCYTES # BLD AUTO: 0.06 X10*3/UL (ref 0–0.7)
IMM GRANULOCYTES NFR BLD AUTO: 0.7 % (ref 0–0.9)
LYMPHOCYTES # BLD AUTO: 1.79 X10*3/UL (ref 1.2–4.8)
LYMPHOCYTES NFR BLD AUTO: 21.6 %
MCH RBC QN AUTO: 28.4 PG (ref 26–34)
MCHC RBC AUTO-ENTMCNC: 32.4 G/DL (ref 32–36)
MCV RBC AUTO: 88 FL (ref 80–100)
MONOCYTES # BLD AUTO: 0.65 X10*3/UL (ref 0.1–1)
MONOCYTES NFR BLD AUTO: 7.9 %
NEUTROPHILS # BLD AUTO: 5.57 X10*3/UL (ref 1.2–7.7)
NEUTROPHILS NFR BLD AUTO: 67.3 %
NRBC BLD-RTO: 0 /100 WBCS (ref 0–0)
PLATELET # BLD AUTO: 286 X10*3/UL (ref 150–450)
POC BILIRUBIN, URINE: NEGATIVE
POC BLOOD, URINE: NEGATIVE
POC FINGERSTICK BLOOD GLUCOSE: 104 MG/DL (ref 70–100)
POC GLUCOSE, URINE: NEGATIVE MG/DL
POC HDL CHOLESTEROL: 41 MG/DL (ref 0–50)
POC HEMOGLOBIN A1C: 5.3 % (ref 4.2–6.5)
POC KETONES, URINE: NEGATIVE MG/DL
POC LDL CHOLESTEROL: 66 MG/DL (ref 0–100)
POC LEUKOCYTES, URINE: ABNORMAL
POC NITRITE,URINE: NEGATIVE
POC NON-HDL CHOLESTEROL: 96 MG/DL (ref 0–130)
POC PH, URINE: 5.5 PH
POC PROTEIN, URINE: NEGATIVE MG/DL
POC SPECIFIC GRAVITY, URINE: >=1.03
POC TOTAL CHOLESTEROL/HDL RATIO: 3.3 (ref 0–4.5)
POC TOTAL CHOLESTEROL: 137 MG/DL (ref 0–199)
POC TRIGLYCERIDES: 148 MG/DL (ref 0–150)
POC UROBILINOGEN, URINE: 0.2 EU/DL
POTASSIUM SERPL-SCNC: 4.6 MMOL/L (ref 3.5–5.3)
PROT SERPL-MCNC: 6.7 G/DL (ref 6.4–8.2)
RBC # BLD AUTO: 4.96 X10*6/UL (ref 4–5.2)
SODIUM SERPL-SCNC: 141 MMOL/L (ref 136–145)
TSH SERPL-ACNC: 1.21 MIU/L (ref 0.44–3.98)
WBC # BLD AUTO: 8.3 X10*3/UL (ref 4.4–11.3)

## 2024-09-23 PROCEDURE — 99396 PREV VISIT EST AGE 40-64: CPT | Performed by: FAMILY MEDICINE

## 2024-09-23 PROCEDURE — 85025 COMPLETE CBC W/AUTO DIFF WBC: CPT

## 2024-09-23 PROCEDURE — 84443 ASSAY THYROID STIM HORMONE: CPT

## 2024-09-23 PROCEDURE — 3008F BODY MASS INDEX DOCD: CPT | Performed by: FAMILY MEDICINE

## 2024-09-23 PROCEDURE — 82962 GLUCOSE BLOOD TEST: CPT | Performed by: FAMILY MEDICINE

## 2024-09-23 PROCEDURE — 81003 URINALYSIS AUTO W/O SCOPE: CPT | Performed by: FAMILY MEDICINE

## 2024-09-23 PROCEDURE — 1036F TOBACCO NON-USER: CPT | Performed by: FAMILY MEDICINE

## 2024-09-23 PROCEDURE — 80061 LIPID PANEL: CPT | Performed by: FAMILY MEDICINE

## 2024-09-23 PROCEDURE — 83036 HEMOGLOBIN GLYCOSYLATED A1C: CPT | Performed by: FAMILY MEDICINE

## 2024-09-23 PROCEDURE — 80053 COMPREHEN METABOLIC PANEL: CPT

## 2024-09-23 ASSESSMENT — ENCOUNTER SYMPTOMS
BACK PAIN: 0
DYSURIA: 0
WOUND: 0
HEADACHES: 0
ARTHRALGIAS: 0
FEVER: 0
DYSPHORIC MOOD: 0
DIARRHEA: 0
NUMBNESS: 0
RHINORRHEA: 0
HEMATURIA: 0
NERVOUS/ANXIOUS: 0
WEAKNESS: 0
VOICE CHANGE: 0
SINUS PRESSURE: 0
SLEEP DISTURBANCE: 0
SORE THROAT: 0
WHEEZING: 0
COUGH: 0
ABDOMINAL PAIN: 0
VOMITING: 0
BLOOD IN STOOL: 0
PALPITATIONS: 0
MYALGIAS: 0
FATIGUE: 0
ROS SKIN COMMENTS: NO MOLES GROWING OR CHANGING.
NAUSEA: 0
ADENOPATHY: 0
DIZZINESS: 0
FREQUENCY: 0
CONSTIPATION: 0
SHORTNESS OF BREATH: 0

## 2024-09-23 ASSESSMENT — PATIENT HEALTH QUESTIONNAIRE - PHQ9
SUM OF ALL RESPONSES TO PHQ9 QUESTIONS 1 AND 2: 0
2. FEELING DOWN, DEPRESSED OR HOPELESS: NOT AT ALL
1. LITTLE INTEREST OR PLEASURE IN DOING THINGS: NOT AT ALL

## 2024-09-23 NOTE — PROGRESS NOTES
"Subjective   Patient ID: [id number]     Maegan Kemp is a 48 y.o. female who presents for Annual Exam (Fasting.  Declines Flu Vaccine.).  HPI  She is here for a general medical examination.      She feels well in general.      She had a colonoscopy in May 2023.  It showed several polyps, one of them 10 mm.  She was told to repeat this in three years.      Last mammo done 5/24.  Normal.  Ordered through gynecology at [facility].    She denies any surgeries or hospitalizations over the last year.   Current Outpatient Medications on File Prior to Visit   Medication Sig Dispense Refill    chlorhexidine (Hibiclens) 2 % external liquid Apply topically once daily as needed for wound care. To axillae and under breasts 250 mL 11    clindamycin (Cleocin T) 1 % swab Apply 1 Application topically 2 times a day. To axillae, under breasts 69 each 11    levothyroxine (Synthroid, Levoxyl) 75 mcg tablet TAKE 1 TABLET BY MOUTH EVERY DAY 90 tablet 1    ruxolitinib (Opzelura) 1.5 % cream cream Apply 1 Application topically 2 times a day. 60 g 6    Vienva 0.1-20 mg-mcg tablet Take 1 tablet by mouth once daily.      hydrocortisone 2.5 % ointment Apply topically 2 times a day for 14 days. 30 g 3     No current facility-administered medications on file prior to visit.     Tobacco Use: Low Risk  (9/23/2024)    Patient History     Smoking Tobacco Use: Never     Smokeless Tobacco Use: Never     Passive Exposure: Not on file   Occasional alcohol.  No drugs.  Exercise. Plays volleyball two nights per week.   She is \"50-50\" on diet.  States she knows she does not get enough fruits and vegetables.     Family History   Problem Relation Name Age of Onset    Thyroid disease Mother      Thyroid disease Father      Atrial fibrillation Father     Sister has thyroid problems and liver cancer at 49.  No other fam history of med illness.    Review of Systems   Constitutional:  Negative for fatigue and fever.   HENT:  Negative for rhinorrhea, sinus pressure, sore " "throat and voice change.    Respiratory:  Negative for cough, shortness of breath and wheezing.    Cardiovascular:  Negative for chest pain, palpitations and leg swelling.   Gastrointestinal:  Negative for abdominal pain, blood in stool, constipation, diarrhea, nausea and vomiting.   Genitourinary:  Negative for dysuria, frequency, hematuria and vaginal bleeding (takes OCs continuously.).   Musculoskeletal:  Negative for arthralgias, back pain and myalgias.   Skin:  Negative for rash and wound.        No moles growing or changing.   Neurological:  Negative for dizziness, syncope, weakness, numbness and headaches.   Hematological:  Negative for adenopathy.   Psychiatric/Behavioral:  Negative for dysphoric mood, self-injury, sleep disturbance and suicidal ideas. The patient is not nervous/anxious.        Objective     /82 (BP Location: Left arm, Patient Position: Sitting)   Temp 36.6 °C (97.9 °F) (Skin)   Ht 1.537 m (5' 0.5\")   Wt 125 kg (276 lb)   BMI 53.02 kg/m²      Physical Exam  Vitals reviewed.   Constitutional:       General: She is not in acute distress.     Appearance: Normal appearance. She is not ill-appearing or toxic-appearing.   HENT:      Head: Normocephalic and atraumatic.      Right Ear: Tympanic membrane, ear canal and external ear normal.      Left Ear: Tympanic membrane, ear canal and external ear normal.      Nose: Nose normal.      Mouth/Throat:      Mouth: Mucous membranes are moist.   Eyes:      Extraocular Movements: Extraocular movements intact.      Conjunctiva/sclera: Conjunctivae normal.      Pupils: Pupils are equal, round, and reactive to light.   Cardiovascular:      Rate and Rhythm: Normal rate and regular rhythm.      Heart sounds: No murmur heard.  Pulmonary:      Effort: Pulmonary effort is normal.      Breath sounds: Normal breath sounds.   Abdominal:      General: Bowel sounds are normal. There is no distension.      Palpations: Abdomen is soft. There is no mass.      " Tenderness: There is no abdominal tenderness. There is no guarding or rebound.   Musculoskeletal:         General: No tenderness.      Cervical back: Neck supple.      Right lower leg: No edema.      Left lower leg: No edema.   Skin:     Coloration: Skin is not jaundiced or pale.      Findings: No rash.      Comments: Vitiligo noted.   Neurological:      General: No focal deficit present.      Mental Status: She is alert and oriented to person, place, and time. Mental status is at baseline.   Psychiatric:         Mood and Affect: Mood normal.         Behavior: Behavior normal.         Thought Content: Thought content normal.         Judgment: Judgment normal.         Assessment/Plan   Problem List Items Addressed This Visit    None  Visit Diagnoses       General medical exam    -  Primary    Relevant Orders    POCT UA Automated manually resulted (Completed)    POCT fingerstick glucose manually resulted (Completed)    POCT glycosylated hemoglobin (Hb A1C) manually resulted (Completed)    POCT Lipid Panel manually resulted (Completed)    Thyroid Stimulating Hormone    Comprehensive Metabolic Panel    CBC and Auto Differential          Your labs show normal cholesterol and blood glucose levels.  Your blood pressure is normal.  Weight loss would benefit your overall health.  You will be due for a colonoscopy in a year and a half.  Follow up with gynecology as directed.  I discussed weight loss medication.  You will consider this and try to decrease sugars and starches in the diet.  Rusty Cuevas, DO

## 2024-09-23 NOTE — PATIENT INSTRUCTIONS
Your labs show normal cholesterol and blood glucose levels.  Your blood pressure is normal.  Weight loss would benefit your overall health.  You will be due for a colonoscopy in a year and a half.  Follow up with gynecology as directed.  I discussed weight loss medication.  You will consider this and try to decrease sugars and starches in the diet.

## 2024-10-13 NOTE — PROGRESS NOTES
Subjective     Maegan Kemp is a 49 y.o. female who presents for the following: Vitiligo (4 month follow up. Vitiligo located on Bilateral Anterior Forearms and Right Anterior Thigh. Currently using Opzelura and At Home Light Exposure. Pt reports improvement. ), Hidradenitis Suppurativa (Hidradenitis Suppurativa located on Bilateral Axillae. Currently using Clindamycin and Hibiclens.  Pt reports improvement. ), and Bullous Pemphigoid (Follow up for Bullous Pemphigoid located on Right Leg and Abdomen. Pt currently using Betamethasone. Pt reports flaring. ).         Review of Systems:  No other skin or systemic complaints other than what is documented elsewhere in the note.    The following portions of the chart were reviewed this encounter and updated as appropriate:       Skin Cancer History  No skin cancer on file.    Specialty Problems    None    Past Medical History:  Maegan Kemp  has a past medical history of Benign neoplasm of connective and other soft tissue, unspecified and Personal history of other benign neoplasm.    Past Surgical History:  Maegan Kemp  has a past surgical history that includes Tonsillectomy and Myomectomy.    Family History:  Patient family history includes Atrial fibrillation in her father; Thyroid disease in her father and mother.       Objective   Well appearing patient in no apparent distress; mood and affect are within normal limits.    A focused skin examination was performed. All findings within normal limits unless otherwise noted below.    Assessment/Plan   1. Bullous pemphigoid (Multi) (2)  Left Lower Leg - Anterior, Right Lower Leg - Anterior  Patient has a fading red patch with no active bullae on R leg    The nature of the diagnosis was explained to patient. Will plan to treat with clobetasol BID x 2 weeks then PRN for flares (pt was treating with  betamethasone). If spreading/not controlling flare, pt to call and we will start predisone. Risks, benefits, side  effects, alternatives and options were discussed with patient and the patient voiced understanding. Pt agrees with plan as above.       Related Medications  clobetasol (Temovate) 0.05 % cream  Apply topically 2 times a day for 14 days.    2. Hidradenitis suppurativa  Left Axilla, Right Axilla  Clear on exam    Will continue chlorhexidine and clindamycin; pt will call if refills needed and/or flares become uncontrolled.     Related Medications  chlorhexidine (Hibiclens) 2 % external liquid  Apply topically once daily as needed for wound care. To axillae and under breasts    clindamycin (Cleocin T) 1 % swab  Apply 1 Application topically 2 times a day. To axillae, under breasts    3. Vitiligo  Left Forearm - Anterior, Right Forearm - Anterior, Right Thigh - Anterior  Depigmented well-demarcated macules and patches; some minor repigmenting on the forearms.    Patient has had some very minor improvement with Opzelura + at home light exposures a few times weekly. Will hold of jaja in office therapy for now but pt to call if flaring. OK to continue Opzelura. Pt agrees with plan as above.       Related Medications  ruxolitinib (Opzelura) 1.5 % cream cream  Apply 1 Application topically 2 times a day.      Follow up 6 months or sooner as needed.

## 2024-10-14 ENCOUNTER — APPOINTMENT (OUTPATIENT)
Dept: DERMATOLOGY | Facility: CLINIC | Age: 49
End: 2024-10-14
Payer: COMMERCIAL

## 2024-10-14 DIAGNOSIS — L73.2 HIDRADENITIS SUPPURATIVA: ICD-10-CM

## 2024-10-14 DIAGNOSIS — L80 VITILIGO: ICD-10-CM

## 2024-10-14 DIAGNOSIS — L12.0 BULLOUS PEMPHIGOID (MULTI): Primary | ICD-10-CM

## 2024-10-14 PROCEDURE — 99214 OFFICE O/P EST MOD 30 MIN: CPT | Performed by: DERMATOLOGY

## 2024-10-14 RX ORDER — CLOBETASOL PROPIONATE 0.5 MG/G
CREAM TOPICAL 2 TIMES DAILY
Qty: 60 G | Refills: 3 | Status: SHIPPED | OUTPATIENT
Start: 2024-10-14 | End: 2024-10-28

## 2025-02-25 DIAGNOSIS — L73.2 HIDRADENITIS SUPPURATIVA: ICD-10-CM

## 2025-02-26 RX ORDER — CLINDAMYCIN PHOSPHATE 10 MG/ML
1 SOLUTION TOPICAL 2 TIMES DAILY
Qty: 60 EACH | Refills: 11 | Status: SHIPPED | OUTPATIENT
Start: 2025-02-26

## 2025-03-14 DIAGNOSIS — E03.9 HYPOTHYROIDISM, UNSPECIFIED: ICD-10-CM

## 2025-03-14 RX ORDER — LEVOTHYROXINE SODIUM 75 UG/1
75 TABLET ORAL DAILY
Qty: 90 TABLET | Refills: 1 | Status: SHIPPED | OUTPATIENT
Start: 2025-03-14

## 2025-04-11 ENCOUNTER — OFFICE VISIT (OUTPATIENT)
Dept: PRIMARY CARE | Facility: CLINIC | Age: 50
End: 2025-04-11
Payer: COMMERCIAL

## 2025-04-11 ENCOUNTER — HOSPITAL ENCOUNTER (OUTPATIENT)
Dept: RADIOLOGY | Facility: CLINIC | Age: 50
Discharge: HOME | End: 2025-04-11
Payer: COMMERCIAL

## 2025-04-11 VITALS
BODY MASS INDEX: 53.02 KG/M2 | SYSTOLIC BLOOD PRESSURE: 134 MMHG | DIASTOLIC BLOOD PRESSURE: 80 MMHG | WEIGHT: 276 LBS | TEMPERATURE: 97.9 F

## 2025-04-11 DIAGNOSIS — J18.9 COMMUNITY ACQUIRED PNEUMONIA OF LEFT LUNG, UNSPECIFIED PART OF LUNG: ICD-10-CM

## 2025-04-11 DIAGNOSIS — J18.9 COMMUNITY ACQUIRED PNEUMONIA OF LEFT LUNG, UNSPECIFIED PART OF LUNG: Primary | ICD-10-CM

## 2025-04-11 PROCEDURE — 71046 X-RAY EXAM CHEST 2 VIEWS: CPT

## 2025-04-11 PROCEDURE — 99214 OFFICE O/P EST MOD 30 MIN: CPT | Performed by: FAMILY MEDICINE

## 2025-04-11 RX ORDER — AMOXICILLIN AND CLAVULANATE POTASSIUM 875; 125 MG/1; MG/1
875 TABLET, FILM COATED ORAL 2 TIMES DAILY
Qty: 20 TABLET | Refills: 0 | Status: SHIPPED | OUTPATIENT
Start: 2025-04-11 | End: 2025-04-21

## 2025-04-11 RX ORDER — ALBUTEROL SULFATE 90 UG/1
2 INHALANT RESPIRATORY (INHALATION) EVERY 4 HOURS PRN
Qty: 8.5 G | Refills: 0 | Status: SHIPPED | OUTPATIENT
Start: 2025-04-11 | End: 2026-04-11

## 2025-04-11 ASSESSMENT — PATIENT HEALTH QUESTIONNAIRE - PHQ9
2. FEELING DOWN, DEPRESSED OR HOPELESS: NOT AT ALL
1. LITTLE INTEREST OR PLEASURE IN DOING THINGS: NOT AT ALL
SUM OF ALL RESPONSES TO PHQ9 QUESTIONS 1 AND 2: 0

## 2025-04-11 NOTE — PROGRESS NOTES
Subjective   Patient ID: [id number]     Maegan Kemp is a 49 y.o. female who presents for Cough (X 3 weeks.), Chest Congestion, and Wheezing.  HPI  She complains of cough for three weeks, chest congestion and wheezing.  The cough has gone away but she still has wheezing and SOB.  No chest pain.  No fever.     She had chills and cold symptoms three weeks ago that went away.    No sore throat.    No heart palpitations or dizziness or fainting.      Tobacco Use: Low Risk  (3/11/2025)    Received from [facility]    Patient History     Smoking Tobacco Use: Never     Smokeless Tobacco Use: Never     Passive Exposure: Not on file         Objective     /80 (BP Location: Left arm, Patient Position: Sitting)   Temp 36.6 °C (97.9 °F) (Skin)   Wt 125 kg (276 lb)   BMI 53.02 kg/m²      Physical Exam  Constitutional:       Appearance: Normal appearance.   Cardiovascular:      Rate and Rhythm: Normal rate and regular rhythm.      Heart sounds: Normal heart sounds. No murmur heard.  Pulmonary:      Effort: Pulmonary effort is normal. No respiratory distress.      Breath sounds: Rhonchi (left sided rhochi.) present.   Neurological:      General: No focal deficit present.      Mental Status: She is alert and oriented to person, place, and time.         Assessment/Plan   Problem List Items Addressed This Visit    None  Visit Diagnoses       Community acquired pneumonia of left lung, unspecified part of lung    -  Primary    Relevant Medications    amoxicillin-pot clavulanate (Augmentin) 875-125 mg tablet    albuterol (ProAir HFA) 90 mcg/actuation inhaler    Other Relevant Orders    XR chest 2 views        Clinically, this sounds like a pneumonia of the left lung.  I will order a chest xray, antibiotics and an inhaler.  Return in one or two weeks for a recheck and right away if  you feel acutely worse.          Rusty Cuevas, DO

## 2025-04-22 ENCOUNTER — APPOINTMENT (OUTPATIENT)
Dept: PRIMARY CARE | Facility: CLINIC | Age: 50
End: 2025-04-22
Payer: COMMERCIAL

## 2025-04-29 NOTE — PROGRESS NOTES
Subjective     Maegan Kemp is a 49 y.o. female who presents for the following: Bullous Pemphigoid (Pt is not activly flare. Pt has been using clobetasol 0.05 % cream PRN. Pt needs prescription refilled ), Hidradenitis Suppurativa (Located to bilateral axilla. Pt uses chlorhexidine 2 % external liquid for wound care and clindamycin 1 % swab BID. Pt reports no flares -pt needs refill of both prescriptions ), and Vitiligo (Located to Left Forearm - Anterior, Right Forearm - Anterior, Right Thigh - Anterior. Pt stopped using Opzelura cream BID because it was not helping. Pt has not doing the at home light therapy since October). Pt also needs refill of the hydrocortisone 2.5% ointment     Review of Systems:  No other skin or systemic complaints other than what is documented elsewhere in the note.    The following portions of the chart were reviewed this encounter and updated as appropriate:       Skin Cancer History  Biopsy Log Book  No skin cancers from Specimen Tracking.    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    Past Surgical History:  Maegan Kemp  has a past surgical history that includes Tonsillectomy and Myomectomy.    Family History:  Patient family history includes Atrial fibrillation in her father; Thyroid disease in her father and mother.       Objective   Well appearing patient in no apparent distress; mood and affect are within normal limits.    A focused skin examination was performed. All findings within normal limits unless otherwise noted below.    Assessment/Plan   Skin Exam  1. BULLOUS PEMPHIGOID (2)  Left Lower Leg - Anterior, Right Lower Leg - Anterior  Patient is clear on exam today but reports 3-4 flares yearly in different areas.   The nature of the diagnosis was explained to patient. Will plan to  treat with clobetasol BID x 2 weeks then PRN for flares (pt was treating with  betamethasone). If spreading/not controlling flare, pt to call and we will start predisone. Also discussed Cellcept for longer term maintenance but pt would liek to hold off at this time. Risks, benefits, side effects, alternatives and options were discussed with patient and the patient voiced understanding. Pt agrees with plan as above.     Related Medications  clobetasol (Temovate) 0.05 % cream  Apply topically 2 times a day. For 2 weeks then as needed for flares  2. OTHER ALLERGIC CONTACT DERMATITIS      Related Medications  hydrocortisone 2.5 % ointment  Apply topically 2 times a day for 14 days.  3. HIDRADENITIS SUPPURATIVA  Left Axilla, Right Axilla  Clear on exam  Will continue chlorhexidine and clindamycin; pt will call if refills needed and/or flares become uncontrolled.   Related Medications  clindamycin (Cleocin T) 1 % swab  Apply 1 Application topically 2 times a day. To axillae, under breasts  chlorhexidine (Hibiclens) 2 % external liquid  Apply topically once daily as needed for wound care. To axillae and under breasts  4. VITILIGO  Left Forearm - Anterior, Right Forearm - Anterior, Right Thigh - Anterior  Depigmented well-demarcated macules and patches; some minor repigmenting on the forearms.  Pt doesn't feel that Opzelura helped after 3 months; pt prefers  to not treat vitiligo at this time. Sun protection and use of at least SPF 30 discussed with patient. Pt instructed to reapply every 2 hours. Risks, benefits, side effects, alternatives and options were discussed with patient and the patient voiced understanding. Pt agrees with plan as above.         Related Medications  ruxolitinib (Opzelura) 1.5 % cream cream  Apply 1 Application topically 2 times a day.    Follow up 6 months or sooner as needed.

## 2025-04-30 ENCOUNTER — APPOINTMENT (OUTPATIENT)
Dept: DERMATOLOGY | Facility: CLINIC | Age: 50
End: 2025-04-30
Payer: COMMERCIAL

## 2025-04-30 DIAGNOSIS — L73.2 HIDRADENITIS SUPPURATIVA: ICD-10-CM

## 2025-04-30 DIAGNOSIS — L23.89 OTHER ALLERGIC CONTACT DERMATITIS: ICD-10-CM

## 2025-04-30 DIAGNOSIS — L12.0 BULLOUS PEMPHIGOID: Primary | ICD-10-CM

## 2025-04-30 DIAGNOSIS — L80 VITILIGO: ICD-10-CM

## 2025-04-30 PROCEDURE — 99214 OFFICE O/P EST MOD 30 MIN: CPT | Performed by: DERMATOLOGY

## 2025-04-30 RX ORDER — HYDROCORTISONE 25 MG/G
OINTMENT TOPICAL 2 TIMES DAILY
Qty: 30 G | Refills: 3 | Status: SHIPPED | OUTPATIENT
Start: 2025-04-30 | End: 2025-05-14

## 2025-04-30 RX ORDER — CLOBETASOL PROPIONATE 0.5 MG/G
CREAM TOPICAL 2 TIMES DAILY
Qty: 60 G | Refills: 11 | Status: SHIPPED | OUTPATIENT
Start: 2025-04-30

## 2025-04-30 RX ORDER — CLINDAMYCIN PHOSPHATE 10 MG/ML
1 SOLUTION TOPICAL 2 TIMES DAILY
Qty: 60 EACH | Refills: 11 | Status: SHIPPED | OUTPATIENT
Start: 2025-04-30

## 2025-04-30 RX ORDER — CLOBETASOL PROPIONATE 0.5 MG/G
CREAM TOPICAL 2 TIMES DAILY
COMMUNITY
End: 2025-04-30 | Stop reason: SDUPTHER

## 2025-04-30 RX ORDER — CHLORHEXIDINE GLUCONATE 2 G/100ML
SOLUTION TOPICAL DAILY PRN
Qty: 250 ML | Refills: 11 | Status: SHIPPED | OUTPATIENT
Start: 2025-04-30

## 2025-04-30 NOTE — Clinical Note
The nature of the diagnosis was explained to patient. Will plan to treat with clobetasol BID x 2 weeks then PRN for flares (pt was treating with  betamethasone). If spreading/not controlling flare, pt to call and we will start predisone. Also discussed Cellcept for longer term maintenance but pt would liek to hold off at this time. Risks, benefits, side effects, alternatives and options were discussed with patient and the patient voiced understanding. Pt agrees with plan as above.      Split-Thickness Skin Graft Text: The defect edges were debeveled with a #15 scalpel blade. Given the location of the defect, shape of the defect and the proximity to free margins a split thickness skin graft was deemed most appropriate. Using a sterile surgical marker, the primary defect shape was transferred to the donor site. The split thickness graft was then harvested.  The skin graft was then placed in the primary defect and oriented appropriately.

## 2025-04-30 NOTE — Clinical Note
Will continue chlorhexidine and clindamycin; pt will call if refills needed and/or flares become uncontrolled.

## 2025-04-30 NOTE — Clinical Note
Pt doesn't feel that Opzelura helped after 3 months; pt prefers  to not treat vitiligo at this time. Sun protection and use of at least SPF 30 discussed with patient. Pt instructed to reapply every 2 hours. Risks, benefits, side effects, alternatives and options were discussed with patient and the patient voiced understanding. Pt agrees with plan as above.

## 2025-09-25 ENCOUNTER — APPOINTMENT (OUTPATIENT)
Dept: PRIMARY CARE | Facility: CLINIC | Age: 50
End: 2025-09-25
Payer: COMMERCIAL

## 2025-09-26 ENCOUNTER — APPOINTMENT (OUTPATIENT)
Dept: PRIMARY CARE | Facility: CLINIC | Age: 50
End: 2025-09-26
Payer: COMMERCIAL

## 2025-10-29 ENCOUNTER — APPOINTMENT (OUTPATIENT)
Dept: DERMATOLOGY | Facility: CLINIC | Age: 50
End: 2025-10-29
Payer: COMMERCIAL